# Patient Record
Sex: MALE | Race: BLACK OR AFRICAN AMERICAN | NOT HISPANIC OR LATINO | Employment: FULL TIME | URBAN - METROPOLITAN AREA
[De-identification: names, ages, dates, MRNs, and addresses within clinical notes are randomized per-mention and may not be internally consistent; named-entity substitution may affect disease eponyms.]

---

## 2024-07-01 ENCOUNTER — HOSPITAL ENCOUNTER (EMERGENCY)
Facility: OTHER | Age: 59
Discharge: HOME | End: 2024-07-01
Attending: EMERGENCY MEDICINE
Payer: MEDICAID

## 2024-07-01 VITALS
WEIGHT: 245 LBS | BODY MASS INDEX: 31.44 KG/M2 | HEIGHT: 74 IN | HEART RATE: 99 BPM | SYSTOLIC BLOOD PRESSURE: 145 MMHG | RESPIRATION RATE: 16 BRPM | DIASTOLIC BLOOD PRESSURE: 92 MMHG | OXYGEN SATURATION: 96 % | TEMPERATURE: 99 F

## 2024-07-01 DIAGNOSIS — R09.A2 GLOBUS SENSATION: ICD-10-CM

## 2024-07-01 DIAGNOSIS — R09.A2 SENSATION OF FOREIGN BODY IN THROAT: Primary | ICD-10-CM

## 2024-07-01 LAB — POCT GLUCOSE: 109 MG/DL (ref 70–110)

## 2024-07-01 PROCEDURE — 99283 EMERGENCY DEPT VISIT LOW MDM: CPT | Mod: 25

## 2024-07-01 PROCEDURE — 93010 ELECTROCARDIOGRAM REPORT: CPT | Mod: ,,, | Performed by: INTERNAL MEDICINE

## 2024-07-01 PROCEDURE — 93005 ELECTROCARDIOGRAM TRACING: CPT

## 2024-07-01 PROCEDURE — 25000003 PHARM REV CODE 250: Performed by: EMERGENCY MEDICINE

## 2024-07-01 PROCEDURE — 82962 GLUCOSE BLOOD TEST: CPT

## 2024-07-01 RX ORDER — LIDOCAINE HYDROCHLORIDE 20 MG/ML
15 SOLUTION OROPHARYNGEAL ONCE
Status: COMPLETED | OUTPATIENT
Start: 2024-07-01 | End: 2024-07-01

## 2024-07-01 RX ORDER — GLUCAGON 1 MG
1 KIT INJECTION ONCE AS NEEDED
Status: DISCONTINUED | OUTPATIENT
Start: 2024-07-01 | End: 2024-07-01

## 2024-07-01 RX ORDER — ALUMINUM HYDROXIDE, MAGNESIUM HYDROXIDE, AND SIMETHICONE 1200; 120; 1200 MG/30ML; MG/30ML; MG/30ML
30 SUSPENSION ORAL ONCE
Status: COMPLETED | OUTPATIENT
Start: 2024-07-01 | End: 2024-07-01

## 2024-07-01 RX ADMIN — LIDOCAINE HYDROCHLORIDE 15 ML: 20 SOLUTION ORAL at 07:07

## 2024-07-01 RX ADMIN — ALUMINUM HYDROXIDE, MAGNESIUM HYDROXIDE, AND SIMETHICONE 30 ML: 1200; 120; 1200 SUSPENSION ORAL at 07:07

## 2024-07-01 NOTE — FIRST PROVIDER EVALUATION
Emergency Department TeleTriage Encounter Note      CHIEF COMPLAINT    Chief Complaint   Patient presents with    Foreign Body In Throat     Pt states that he took his home meds this am and feels like its stuck in throat, he has tried to eat since then but is unable to get anything down. No SOB and no nausea. Pt feels like he's having really bad indegestion at this time.        VITAL SIGNS   Initial Vitals [07/01/24 1754]   BP Pulse Resp Temp SpO2   (!) 148/86 (!) 114 18 98 °F (36.7 °C) 99 %      MAP       --            ALLERGIES    Review of patient's allergies indicates:  No Known Allergies    PROVIDER TRIAGE NOTE  This is a teletriage evaluation of a 58 y.o. male presenting to the ED complaining of foreign body sensation. Patient states it feels like a pill he took this morning is stuck in his throat. Pain started after taking the medicine and has progressively worsened throughout the day. He is able to tolerate his secretions however he reports increased pain and vomiting when he tried to eat.    Patient is alert and oriented. He speaks in complete sentences. He is sitting upright in the chair in no distress.        Initial orders will be placed and care will be transferred to an alternate provider when patient is roomed for a full evaluation. Any additional orders and the final disposition will be determined by that provider.         ORDERS  Labs Reviewed - No data to display    ED Orders (720h ago, onward)      Start Ordered     Status Ordering Provider    07/01/24 1802 07/01/24 1801  EKG 12-lead  Once         Ordered ANDRES MONTANEZ              Virtual Visit Note: The provider triage portion of this emergency department evaluation and documentation was performed via [employer], a HIPAA-compliant telemedicine application, in concert with a tele-presenter in the room. A face to face patient evaluation with one of my colleagues will occur once the patient is placed in an emergency department  room.      DISCLAIMER: This note was prepared with enymotion voice recognition transcription software. Garbled syntax, mangled pronouns, and other bizarre constructions may be attributed to that software system.

## 2024-07-01 NOTE — ED TRIAGE NOTES
"FB sensation in throat since 9 am today after taking daily meds. States he has been unable to tolerate any oral intake - "comes right back up". Denies h/o esophageal narrowing or stricture. Presents awake, alert. Tolerating oral secretions at this time.  "

## 2024-07-02 LAB
OHS QRS DURATION: 94 MS
OHS QTC CALCULATION: 439 MS

## 2024-07-02 NOTE — ED PROVIDER NOTES
"  Source of History:  Medical record, patient, patient's wife.      Chief complaint:  Per triage note: "Foreign Body In Throat (Pt states that he took his home meds this am and feels like its stuck in throat, he has tried to eat since then but is unable to get anything down. No SOB and no nausea. Pt feels like he's having really bad indegestion at this time. )  "    HPI:    Patient presents for evaluation of foreign body sensation immediately after taking his morning pills.  He states that afterwards when he would try to drink water it came immediately back up.  About an hour after this occurred, he ate a small amount of food, but most of it came back up.  Since then he has not tolerated any liquids.  He states that he has had similar symptoms in the past, and has a take some care to drink water when he takes his pills.       ROS:   See HPI for pertinent review of systems    Review of patient's allergies indicates:  No Known Allergies    PMH:  As per HPI and below:  History reviewed. No pertinent past medical history.    History reviewed. No pertinent surgical history.         Physical Exam:      Nursing note and vitals reviewed.  BP (!) 145/92 (BP Location: Left arm, Patient Position: Sitting)   Pulse 99   Temp 98.6 °F (37 °C) (Oral)   Resp 16   Ht 6' 2" (1.88 m)   Wt 111.1 kg (245 lb)   SpO2 96%   BMI 31.46 kg/m²     Constitutional: No distress.  Eyes: EOMI. No discharge. Anicteric.  HENT:   Neck: Normal range of motion. Neck supple.  Cardiovascular: Normal rate. No murmur, no gallop and no friction rub heard.   Pulmonary/Chest: No respiratory distress. Effort normal. No wheezes, no rales, no rhonchi.   Abdominal: Bowel sounds normal. Soft. No distension and no mass. There is no tenderness. There is no rebound, no guarding, no tenderness at McBurney's point.  Neurological: GCS 15. Alert and oriented to person, place, and time. No gross cranial nerve, light touch or strength deficit. Coordination normal. " "  Skin: Skin is warm and dry.   EXT: 2+ radial pulses.       Medical Decision Making / Independent Interpretations / External Records Reviewed:      Patient is a 58-year-old male with BPH who presents for evaluation of retrosternal foreign body sensation after taking his morning pills which includes several larger "real dry" pills.  This has been a problem in the past, but never this severely.  He was unable to tolerate liquids since soon afterwards until arrival here.  Physical exam is nonfocal.  The initial differential included esophageal spasm, esophageal stenosis, dysrhythmia.  I doubt acute coronary syndrome.    ED Course as of 07/01/24 1955 Mon Jul 01, 2024 1927 Patient tolerated small cup of water, states that his symptoms are much improved.  I suspect he has esophageal stricture versus esophageal spasm.  At this time no indication for emergent intervention including endoscopy, however patient would likely benefit from GI follow-up and outpt EGD.  [RC]   1954 --  I discussed with pt and/or guardian/caretaker that this evaluation in the ED does not suggest any emergent or life threatening medical condition requiring admission or further immediate intervention or diagnostics. Regardless, an unremarkable evaluation in the ED does not preclude the development or presence of a serious or life threatening condition. Pt was instructed to return for any worsening, new, changed, or concerning symptoms.     I had a detailed discussion with patient and/or guardian/caretaker regarding findings, plan, return precautions, importance of medication adherence, need to follow-up with a PCP and specialist. All questions answered.     Note was created using voice recognition software. It may have occasional typographical errors not identified and edited despite initial review prior to signing.   [RC]      ED Course User Index  [RC] Timmy Godfrey MD       --  I decided to obtain the patient's medical records. I reviewed " patient's prior external notes / results: specialist documentation   .   --  Additional Medical Decision Making: Drug therapy requiring intensive monitoring for toxicity given or considered    Medications   aluminum-magnesium hydroxide-simethicone 200-200-20 mg/5 mL suspension 30 mL (30 mLs Oral Given 7/1/24 1932)     And   LIDOcaine viscous HCl 2% oral solution 15 mL (15 mLs Oral Given 7/1/24 1933)              No future appointments.     Diagnostic Impression:    1. Sensation of foreign body in throat    2. Globus sensation                  Timmy Godfrey MD  07/01/24 1955     Monthly or less

## 2024-07-02 NOTE — ED NOTES
Pt resting comfortably. ED workup in progress. Call light within reach. Safety measures in place. Denies further needs. Plan of care ongoing.

## 2024-07-02 NOTE — ED NOTES
Oral challenge completed. Patient able to swallow cup of water and tolerated well. Dr Godfrey at bedside when challenge done.

## 2024-07-02 NOTE — DISCHARGE INSTRUCTIONS
